# Patient Record
Sex: FEMALE | Race: AMERICAN INDIAN OR ALASKA NATIVE
[De-identification: names, ages, dates, MRNs, and addresses within clinical notes are randomized per-mention and may not be internally consistent; named-entity substitution may affect disease eponyms.]

---

## 2019-09-08 ENCOUNTER — HOSPITAL ENCOUNTER (EMERGENCY)
Dept: HOSPITAL 5 - ED | Age: 39
LOS: 1 days | Discharge: HOME | End: 2019-09-09
Payer: MEDICAID

## 2019-09-08 DIAGNOSIS — Z88.8: ICD-10-CM

## 2019-09-08 DIAGNOSIS — Z79.899: ICD-10-CM

## 2019-09-08 DIAGNOSIS — A60.04: Primary | ICD-10-CM

## 2019-09-08 DIAGNOSIS — G43.909: ICD-10-CM

## 2019-09-08 DIAGNOSIS — I10: ICD-10-CM

## 2019-09-08 PROCEDURE — 99282 EMERGENCY DEPT VISIT SF MDM: CPT

## 2019-09-08 NOTE — EVENT NOTE
ED Screening Note


Date of service: 09/08/19


Time: 21:29


ED Screening Note: 





39 y o f presents to Ed cc of vaginal discomfort with associated right thigh 

groin pain from a herpes outbreak


This initial assessment/diagnostic orders/clinical plan/treatment(s) is/are 

subject to change based on patients health status, clinical progression and re-

assessment by fellow clinical providers in the ED. Further treatment and workup 

at subsequent clinical providers discretion. Patient/guardian urged not to elope

from the ED as their condition may be serious if not clinically assessed and 

managed. 





Initial orders include: 


ACC evaluate

## 2019-09-09 VITALS — DIASTOLIC BLOOD PRESSURE: 76 MMHG | SYSTOLIC BLOOD PRESSURE: 118 MMHG

## 2019-09-09 NOTE — EMERGENCY DEPARTMENT REPORT
ED Female  HPI





- General


Chief complaint: Urogenital-Female


Stated complaint: PAIN IN LEG, HERPES OUTBREAK


Time Seen by Provider: 19 21:28


Source: patient


Mode of arrival: Ambulatory


Limitations: No Limitations





- History of Present Illness


Initial comments: 





Patient is a A0 39-year-old -American female with past medical 

history of chronic recurrent genital herpes presents  to the ED with complaint 

of painful erythematous ulcerative lesions in the vaginal area for the last 2 

days.  Patient also states that she had similar rashes in oral cavity and 

relates about 5 days ago and which has since resolved.  Patient denies fever, 

chills, nausea, vomiting, dysuria, urinary frequency and urgency, vaginal 

itching, abdominal pain, vaginal discharge or vaginal bleeding, low back pain 

and dyspareunia.


MD Complaint: possible STD, other (gENITAL HERPES OUTBREAK)


-: Sudden, days(s) (2)


Location: labia, perineum


Radiation: non-radiating


Severity: moderate


Severity scale (0 -10): 4


Quality: cramping, sharp, burning


Consistency: constant


Improves with: none


Worsens with: movement


Are you Pregnant Now?: No


Last Menstrual Period: 19


EDC: 20


Associated Symptoms: denies other symptoms, rash (erythematous ulcerated lesion 

on perineum).  denies: vaginal discharge, vaginal bleeding, abdominal pain, 

nausea/vomiting, fever/chills, headaches, loss of appetite, hematuria





- Related Data


Sexually active: Yes


: 1


Para: 1


A: 0


                                  Previous Rx's











 Medication  Instructions  Recorded  Last Taken  Type


 


Valacyclovir HCl [Valtrex] 1,000 mg PO Q8H #30 tablet 19 Unknown Rx











                                    Allergies











Allergy/AdvReac Type Severity Reaction Status Date / Time


 


topiramate [From Topamax] Allergy  Unknown Verified 19 21:13














ED Review of Systems


ROS: 


Stated complaint: PAIN IN LEG, HERPES OUTBREAK


Other details as noted in HPI





Constitutional: denies: chills, fever


Eyes: denies: eye pain, eye discharge, vision change


ENT: denies: ear pain, throat pain


Respiratory: denies: cough, shortness of breath, wheezing


Cardiovascular: denies: chest pain, palpitations


Endocrine: no symptoms reported


Gastrointestinal: denies: abdominal pain, nausea, diarrhea


Genitourinary: denies: urgency, dysuria, discharge


Musculoskeletal: denies: back pain, joint swelling, arthralgia


Skin: rash (perineum ulcerated lesions with pain), lesions (Ulcerated lesion on 

perineum), pruritus


Neurological: denies: headache, weakness, paresthesias


Psychiatric: denies: anxiety, depression


Hematological/Lymphatic: denies: easy bleeding, easy bruising





ED Past Medical Hx





- Past Medical History


Hx Hypertension: Yes


Hx Headaches / Migraines: Yes


Additional medical history: vaginal herpes,lupus





- Surgical History


Past Surgical History?: No





- Social History


Smoking Status: Never Smoker


Substance Use Type: None





- Medications


Home Medications: 


                                Home Medications











 Medication  Instructions  Recorded  Confirmed  Last Taken  Type


 


Valacyclovir HCl [Valtrex] 1,000 mg PO Q8H #30 tablet 19  Unknown Rx














ED Physical Exam





- General


Limitations: No Limitations


General appearance: alert, in no apparent distress





- Head


Head exam: Present: atraumatic, normocephalic





- Eye


Eye exam: Present: normal appearance, PERRL, EOMI


Pupils: Present: normal accommodation





- ENT


ENT exam: Present: normal exam, normal orophraynx, mucous membranes moist, TM's 

normal bilaterally, normal external ear exam





- Neck


Neck exam: Present: normal inspection, full ROM





- Respiratory


Respiratory exam: Present: normal lung sounds bilaterally.  Absent: respiratory 

distress, wheezes, rales, rhonchi, chest wall tenderness, decreased breath 

sounds, prolonged expiratory





- Cardiovascular


Cardiovascular Exam: Present: regular rate, normal rhythm, normal heart sounds. 

 Absent: systolic murmur, diastolic murmur, rubs, gallop





- GI/Abdominal


GI/Abdominal exam: Present: soft, normal bowel sounds.  Absent: tenderness, 

guarding, rebound, hyperactive bowel sounds, hypoactive bowel sounds, 

organomegaly





- 


External exam: Present: other (Mildly erythematous ulcerated tender lesions in 

the perineum)


Bi-manual exam: Present: other (Female RN chaperone present during pelvic exam)





- Extremities Exam


Extremities exam: Present: normal inspection, full ROM, normal capillary refill





- Back Exam


Back exam: Present: normal inspection, full ROM.  Absent: muscle spasm, 

paraspinal tenderness





- Neurological Exam


Neurological exam: Present: alert, oriented X3, CN II-XII intact, normal gait, 

reflexes normal





- Psychiatric


Psychiatric exam: Present: normal affect, normal mood, anxious





- Skin


Skin exam: Present: warm, dry, intact, normal color.  Absent: rash





ED Course





                                   Vital Signs











  19





  21:30


 


Temperature 98.3 F


 


Pulse Rate 77


 


Respiratory 16





Rate 


 


Blood Pressure 109/70


 


O2 Sat by Pulse 98





Oximetry 














- Reevaluation(s)


Reevaluation #1: 





19 01:50


Patient is alert and oriented 3 and is not in distress.  There is on the 

patient's physical exam findings of ulcerated tender lesions in the perineum, 

patient was discharged home on Valtrex 1 g 3 times a day and was advised to 

follow-up with her primary care physician in 7-10 days for reevaluation or 

return to the ED immediately if symptoms get worse.





ED Medical Decision Making





- Medical Decision Making





Patient is alert and oriented 3 and is not in distress.  There is on the 

patient's physical exam findings of ulcerated tender lesions in the perineum, 

patient was discharged home on Valtrex 1 g 3 times a day and was advised to 

follow-up with her primary care physician in 7-10 days for reevaluation or 

return to the ED immediately if symptoms get worse.





- Differential Diagnosis


Genital herpes outbreak; Vaginitis candidal; Folliculitis


Critical care attestation.: 


If time is entered above; I have spent that time in minutes in the direct care 

of this critically ill patient, excluding procedure time.








ED Disposition


Clinical Impression: 


 Genital herpes simplex type 2





Disposition: DC-01 TO HOME OR SELFCARE


Is pt being admited?: No


Does the pt Need Aspirin: No


Condition: Stable


Additional Instructions: 


Take medications with food, drink plenty of fluids and follow-up with your 

primary care physician in 7-10 days for reevaluation, return to the ED immed

iately if symptoms get worse.


Prescriptions: 


Valacyclovir HCl [Valtrex] 1,000 mg PO Q8H #30 tablet


Referrals: 


KIANA ROWE NP [Primary Care Provider] - 3-5 Days


Time of Disposition: 01:42


Print Language: ENGLISH

## 2021-07-11 ENCOUNTER — HOSPITAL ENCOUNTER (EMERGENCY)
Dept: HOSPITAL 5 - ED | Age: 41
Discharge: LEFT BEFORE DISCHARGE | End: 2021-07-11
Payer: SELF-PAY

## 2021-07-11 VITALS — DIASTOLIC BLOOD PRESSURE: 80 MMHG | SYSTOLIC BLOOD PRESSURE: 125 MMHG

## 2021-07-11 DIAGNOSIS — Z00.8: Primary | ICD-10-CM

## 2021-07-11 DIAGNOSIS — Z53.21: ICD-10-CM

## 2022-01-22 ENCOUNTER — HOSPITAL ENCOUNTER (EMERGENCY)
Dept: HOSPITAL 5 - ED | Age: 42
Discharge: LEFT BEFORE DISCHARGE | End: 2022-01-22
Payer: SELF-PAY

## 2022-01-22 DIAGNOSIS — R07.9: Primary | ICD-10-CM

## 2022-01-22 DIAGNOSIS — Z53.21: ICD-10-CM
